# Patient Record
Sex: MALE | Race: WHITE | NOT HISPANIC OR LATINO | Employment: OTHER | URBAN - METROPOLITAN AREA
[De-identification: names, ages, dates, MRNs, and addresses within clinical notes are randomized per-mention and may not be internally consistent; named-entity substitution may affect disease eponyms.]

---

## 2017-09-29 ENCOUNTER — FLU SHOT (OUTPATIENT)
Dept: FAMILY MEDICINE CLINIC | Facility: CLINIC | Age: 67
End: 2017-09-29

## 2017-09-29 PROCEDURE — 90471 IMMUNIZATION ADMIN: CPT | Performed by: FAMILY MEDICINE

## 2017-09-29 PROCEDURE — 90662 IIV NO PRSV INCREASED AG IM: CPT | Performed by: FAMILY MEDICINE

## 2018-09-11 ENCOUNTER — OFFICE VISIT (OUTPATIENT)
Dept: INTERNAL MEDICINE | Facility: CLINIC | Age: 68
End: 2018-09-11

## 2018-09-11 VITALS
RESPIRATION RATE: 16 BRPM | TEMPERATURE: 98.3 F | BODY MASS INDEX: 31.15 KG/M2 | DIASTOLIC BLOOD PRESSURE: 102 MMHG | HEIGHT: 72 IN | HEART RATE: 75 BPM | SYSTOLIC BLOOD PRESSURE: 179 MMHG | OXYGEN SATURATION: 98 % | WEIGHT: 230 LBS

## 2018-09-11 DIAGNOSIS — I73.9 CLAUDICATION OF LEFT LOWER EXTREMITY (HCC): Primary | ICD-10-CM

## 2018-09-11 PROCEDURE — 99213 OFFICE O/P EST LOW 20 MIN: CPT | Performed by: FAMILY MEDICINE

## 2018-09-11 NOTE — PROGRESS NOTES
CC:L leg     Subjective.../HPI  Patient present today with L leg pain 3-4 weeks,thigh and calf,no swelling. No hx of back injury    I have reviewed the patient's medical history in detail and updated the computerized patient record.    History reviewed. No pertinent past medical history.    Past Surgical History:   Procedure Laterality Date   • ADENOIDECTOMY         Family History   Problem Relation Age of Onset   • Heart disease Father        Social History     Social History   • Marital status:      Spouse name: N/A   • Number of children: N/A   • Years of education: N/A     Occupational History   • Not on file.     Social History Main Topics   • Smoking status: Former Smoker   • Smokeless tobacco: Never Used   • Alcohol use No   • Drug use: No   • Sexual activity: Yes     Partners: Female     Other Topics Concern   • Not on file     Social History Narrative   • No narrative on file       Most Recent Immunizations   Administered Date(s) Administered   • Flu Vaccine High Dose PF 65YR+ 09/29/2017   • Flu Vaccine Quad PF >18YRS 09/21/2016       Review of Systems:   Review of Systems   Constitutional: Negative.    HENT: Negative.    Eyes: Negative.    Respiratory: Negative.    Cardiovascular: Negative.    Gastrointestinal: Negative.    Endocrine: Negative.    Genitourinary: Negative.    Musculoskeletal: Negative.    Skin: Negative.    Allergic/Immunologic: Negative.    Neurological: Negative.    Hematological: Negative.    Psychiatric/Behavioral: Negative.          Physical Exam   Constitutional: He is oriented to person, place, and time. He appears well-developed and well-nourished.   Cardiovascular: Normal rate, regular rhythm and normal heart sounds.    Pulmonary/Chest: Effort normal and breath sounds normal.   Neurological: He is alert and oriented to person, place, and time.   Psychiatric: He has a normal mood and affect. His behavior is normal.   Vitals reviewed.  no DP pulses  Leg no numbness and  "FROM    Vital Signs     Vitals:    09/11/18 1831   BP: (!) 179/102   BP Location: Left arm   Patient Position: Sitting   Cuff Size: Large Adult   Pulse: 75   Resp: 16   Temp: 98.3 °F (36.8 °C)   TempSrc: Oral   SpO2: 98%   Weight: 104 kg (230 lb)   Height: 182.9 cm (72\")          Results Review:      REVIEWED AND DISCUSSED CLINICAL RESULTS WITH PATIENT      Requested Prescriptions      No prescriptions requested or ordered in this encounter       No current outpatient prescriptions on file.    Procedures          There are no diagnoses linked to this encounter.     No Follow-up on file.    Lon Irwin M.D  09/11/18  6:55 PM          "

## 2018-09-17 ENCOUNTER — HOSPITAL ENCOUNTER (OUTPATIENT)
Dept: CARDIOLOGY | Facility: HOSPITAL | Age: 68
Discharge: HOME | End: 2018-09-17
Attending: FAMILY MEDICINE | Admitting: FAMILY MEDICINE

## 2018-09-17 DIAGNOSIS — I73.9 CLAUDICATION OF LEFT LOWER EXTREMITY (HCC): ICD-10-CM

## 2018-09-17 LAB
BH CV LOWER ARTERIAL LEFT ABI RATIO: 0.4
BH CV LOWER ARTERIAL LEFT DORSALIS PEDIS SYS MAX: 75 MMHG
BH CV LOWER ARTERIAL LEFT GREAT TOE SYS MAX: 51 MMHG
BH CV LOWER ARTERIAL LEFT HIGH THIGH SYS MAX: 112 MMHG
BH CV LOWER ARTERIAL LEFT LOW THIGH SYS MAX: 107 MMHG
BH CV LOWER ARTERIAL LEFT POPLITEAL SYS MAX: 84 MMHG
BH CV LOWER ARTERIAL LEFT POST TIBIAL SYS MAX: 77 MMHG
BH CV LOWER ARTERIAL LEFT TBI RATIO: 0.26
BH CV LOWER ARTERIAL RIGHT ABI RATIO: 1.11
BH CV LOWER ARTERIAL RIGHT DORSALIS PEDIS SYS MAX: 214 MMHG
BH CV LOWER ARTERIAL RIGHT GREAT TOE SYS MAX: 157 MMHG
BH CV LOWER ARTERIAL RIGHT HIGH THIGH SYS MAX: 233 MMHG
BH CV LOWER ARTERIAL RIGHT LOW THIGH SYS MAX: 241 MMHG
BH CV LOWER ARTERIAL RIGHT POPLITEAL SYS MAX: 204 MMHG
BH CV LOWER ARTERIAL RIGHT POST TIBIAL SYS MAX: 215 MMHG
BH CV LOWER ARTERIAL RIGHT TBI RATIO: 0.81
UPPER ARTERIAL LEFT ARM BRACHIAL SYS MAX: 189 MMHG
UPPER ARTERIAL RIGHT ARM BRACHIAL SYS MAX: 194 MMHG

## 2018-09-17 PROCEDURE — 93923 UPR/LXTR ART STDY 3+ LVLS: CPT

## 2018-09-21 ENCOUNTER — TELEPHONE (OUTPATIENT)
Dept: INTERNAL MEDICINE | Facility: CLINIC | Age: 68
End: 2018-09-21

## 2018-09-25 DIAGNOSIS — I73.9 PERIPHERAL VASCULAR DISEASE OF LOWER EXTREMITY (HCC): Primary | ICD-10-CM

## 2018-09-27 ENCOUNTER — TRANSCRIBE ORDERS (OUTPATIENT)
Dept: ADMINISTRATIVE | Facility: HOSPITAL | Age: 68
End: 2018-09-27

## 2018-09-27 DIAGNOSIS — I73.9 CLAUDICATION (HCC): Primary | ICD-10-CM

## 2018-10-04 ENCOUNTER — HOSPITAL ENCOUNTER (OUTPATIENT)
Dept: CT IMAGING | Facility: HOSPITAL | Age: 68
Discharge: HOME | End: 2018-10-04
Attending: SURGERY | Admitting: SURGERY

## 2018-10-04 DIAGNOSIS — I73.9 CLAUDICATION (HCC): ICD-10-CM

## 2018-10-04 LAB — CREAT BLDA-MCNC: 1.3 MG/DL (ref 0.6–1.3)

## 2018-10-04 PROCEDURE — 75635 CT ANGIO ABDOMINAL ARTERIES: CPT

## 2018-10-04 PROCEDURE — 82565 ASSAY OF CREATININE: CPT

## 2018-10-04 PROCEDURE — 0 IOPAMIDOL PER 1 ML: Performed by: SURGERY

## 2018-10-04 RX ADMIN — IOPAMIDOL 100 ML: 755 INJECTION, SOLUTION INTRAVENOUS at 09:37

## 2021-02-10 ENCOUNTER — IMMUNIZATION (OUTPATIENT)
Dept: VACCINE CLINIC | Facility: HOSPITAL | Age: 71
End: 2021-02-10

## 2021-02-10 PROCEDURE — 0001A: CPT | Performed by: INTERNAL MEDICINE

## 2021-02-10 PROCEDURE — 91300 HC SARSCOV02 VAC 30MCG/0.3ML IM: CPT | Performed by: INTERNAL MEDICINE

## 2021-03-03 ENCOUNTER — IMMUNIZATION (OUTPATIENT)
Dept: VACCINE CLINIC | Facility: HOSPITAL | Age: 71
End: 2021-03-03

## 2021-03-03 PROCEDURE — 91300 HC SARSCOV02 VAC 30MCG/0.3ML IM: CPT | Performed by: INTERNAL MEDICINE

## 2021-03-03 PROCEDURE — 0002A: CPT | Performed by: INTERNAL MEDICINE

## 2021-10-06 ENCOUNTER — IMMUNIZATION (OUTPATIENT)
Dept: VACCINE CLINIC | Facility: HOSPITAL | Age: 71
End: 2021-10-06

## 2021-10-06 PROCEDURE — 0004A ADM SARSCOV2 30MCG/0.3ML BOOSTER: CPT | Performed by: INTERNAL MEDICINE

## 2021-10-06 PROCEDURE — 0003A: CPT | Performed by: INTERNAL MEDICINE

## 2021-10-06 PROCEDURE — 91300 HC SARSCOV02 VAC 30MCG/0.3ML IM: CPT | Performed by: INTERNAL MEDICINE

## 2024-04-29 DIAGNOSIS — I73.9 PAD (PERIPHERAL ARTERY DISEASE): ICD-10-CM

## 2024-04-29 DIAGNOSIS — I65.23 BILATERAL CAROTID ARTERY OCCLUSION: Primary | ICD-10-CM

## 2024-04-29 RX ORDER — CILOSTAZOL 100 MG/1
100 TABLET ORAL 2 TIMES DAILY
Qty: 60 TABLET | Refills: 5 | Status: SHIPPED | OUTPATIENT
Start: 2024-04-29

## 2024-05-06 PROBLEM — I65.29 CAROTID ARTERY STENOSIS: Status: ACTIVE | Noted: 2024-05-06

## 2024-05-06 PROBLEM — I73.9 PERIPHERAL VASCULAR DISEASE: Status: ACTIVE | Noted: 2024-05-06

## 2024-05-30 ENCOUNTER — OFFICE VISIT (OUTPATIENT)
Age: 74
End: 2024-05-30
Payer: COMMERCIAL

## 2024-05-30 ENCOUNTER — HOSPITAL ENCOUNTER (OUTPATIENT)
Facility: HOSPITAL | Age: 74
Discharge: HOME | End: 2024-05-30
Payer: COMMERCIAL

## 2024-05-30 VITALS
SYSTOLIC BLOOD PRESSURE: 162 MMHG | BODY MASS INDEX: 28.44 KG/M2 | DIASTOLIC BLOOD PRESSURE: 82 MMHG | HEIGHT: 72 IN | WEIGHT: 210 LBS

## 2024-05-30 DIAGNOSIS — I65.23 CAROTID STENOSIS, BILATERAL: ICD-10-CM

## 2024-05-30 DIAGNOSIS — I73.9 PAD (PERIPHERAL ARTERY DISEASE): ICD-10-CM

## 2024-05-30 DIAGNOSIS — I65.23 BILATERAL CAROTID ARTERY STENOSIS: Primary | ICD-10-CM

## 2024-05-30 DIAGNOSIS — I65.23 BILATERAL CAROTID ARTERY OCCLUSION: ICD-10-CM

## 2024-05-30 DIAGNOSIS — I73.9 PERIPHERAL VASCULAR DISEASE: ICD-10-CM

## 2024-05-30 PROCEDURE — 93922 UPR/L XTREMITY ART 2 LEVELS: CPT

## 2024-05-30 PROCEDURE — 93880 EXTRACRANIAL BILAT STUDY: CPT

## 2024-05-30 NOTE — PROGRESS NOTES
Chief Complaint  Carotid Artery Disease and Peripheral Vascular Disease    Subjective        Michael Johnson presents to [facility] VASCULAR SURGERY  HPI   Michael Johnson is a 74 y.o. male that has been followed in our office Dr. Camargo for carotid artery stenosis and peripheral arterial disease.  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  Dr. Camargo had discussed proceeding with surgery with him in the past, however he had preferred medical treatment with Pletal and a walking protocol.  He returns today in follow up along with a carotid duplex and ABIs. He  reports he has been doing well without hospitalizations or surgeries. He denies any symptoms consistent with CVA, TIA, or amaurosis fugax. He  denies any worsening claudication symptoms, rest pain, or tissue loss. He reports improvement in symptoms since last visit.     Review of Systems   Constitutional:  Negative for fever.   Eyes:  Negative for visual disturbance.   Cardiovascular:  Negative for leg swelling.   Gastrointestinal:  Negative for abdominal pain.   Musculoskeletal:  Negative for back pain.   Skin:  Negative for color change, pallor and wound.   Neurological:  Negative for dizziness, facial asymmetry, speech difficulty and weakness.        Michael Johnson  reports that he has quit smoking. He has never used smokeless tobacco..        Objective   Vital Signs:  Vitals:    05/30/24 1357   BP: 162/82      Body mass index is 28.48 kg/m².   BMI is >= 25 and <30. (Overweight) The following options were offered after discussion;: information on healthy weight added to patient's after visit summary         Physical Exam  Vitals reviewed.   Constitutional:       Appearance: Normal appearance.   HENT:      Head: Normocephalic.   Cardiovascular:      Rate and Rhythm: Normal rate and regular rhythm.      Pulses: Normal pulses.           Dorsalis pedis pulses are detected w/ Doppler on the right side and detected w/ Doppler  on the left side.        Posterior tibial pulses are detected w/ Doppler on the right side and detected w/ Doppler on the left side.   Pulmonary:      Effort: Pulmonary effort is normal.   Skin:     General: Skin is warm.   Neurological:      General: No focal deficit present.      Mental Status: He is alert and oriented to person, place, and time.   Psychiatric:         Mood and Affect: Mood normal.          Result Review :      Previous carotid duplex: Less than 50% stenosis on the right, 50 to 69% stenosis on the left    Carotid duplex from today: Normal velocities on the right, 50 to 69% stenosis on the left    Previous ABIs: 0.6 on the right, 0.41 on the left    ABIs today: 0.65 on the right, 0.55 on the left                   Assessment and Plan     Diagnoses and all orders for this visit:    1. Bilateral carotid artery stenosis (Primary)    2. Peripheral vascular disease  -     Doppler Ankle Brachial Index Single Level CAR; Future    3. Carotid stenosis, bilateral  -     Duplex Carotid Ultrasound CAR; Future             Patient presents today for ongoing management of his  carotid artery stenosis and peripheral arterial disease. He is to continue his antiplatelet agent which is cilostazol. He is on a statin for cholesterol control.  We discussed adequate blood pressure control. He will return in 1 year along with a repeat carotid artery duplex and ALEJANDRA.    Follow Up     Return in about 1 year (around 5/30/2025) for carotid duplex, alejandra.  Patient was given instructions and counseling regarding his condition or for health maintenance advice. Please see specific information pulled into the AVS if appropriate.     TAMI Bee

## 2024-05-31 LAB
BH CV LOWER ARTERIAL LEFT ABI RATIO: 0.55
BH CV LOWER ARTERIAL LEFT DORSALIS PEDIS SYS MAX: 68
BH CV LOWER ARTERIAL LEFT POST TIBIAL SYS MAX: 92
BH CV LOWER ARTERIAL RIGHT ABI RATIO: 0.65
BH CV LOWER ARTERIAL RIGHT DORSALIS PEDIS SYS MAX: 110
BH CV LOWER ARTERIAL RIGHT POST TIBIAL SYS MAX: 108
UPPER ARTERIAL LEFT ARM BRACHIAL SYS MAX: 168
UPPER ARTERIAL RIGHT ARM BRACHIAL SYS MAX: 162

## 2024-06-01 LAB
BH CV XLRA MEAS LEFT CAROTID BULB EDV: 30.6 CM/SEC
BH CV XLRA MEAS LEFT CAROTID BULB PSV: 83.3 CM/SEC
BH CV XLRA MEAS LEFT DIST CCA EDV: -24 CM/SEC
BH CV XLRA MEAS LEFT DIST CCA PSV: -80.3 CM/SEC
BH CV XLRA MEAS LEFT DIST ICA EDV: -29.5 CM/SEC
BH CV XLRA MEAS LEFT DIST ICA PSV: -101.2 CM/SEC
BH CV XLRA MEAS LEFT ICA/CCA RATIO: 2.45
BH CV XLRA MEAS LEFT MID CCA EDV: 22.3 CM/SEC
BH CV XLRA MEAS LEFT MID CCA PSV: 78 CM/SEC
BH CV XLRA MEAS LEFT MID ICA EDV: -60.2 CM/SEC
BH CV XLRA MEAS LEFT MID ICA PSV: -179.4 CM/SEC
BH CV XLRA MEAS LEFT PROX CCA EDV: 15.2 CM/SEC
BH CV XLRA MEAS LEFT PROX CCA PSV: 75.6 CM/SEC
BH CV XLRA MEAS LEFT PROX ECA EDV: -17.3 CM/SEC
BH CV XLRA MEAS LEFT PROX ECA PSV: -147.7 CM/SEC
BH CV XLRA MEAS LEFT PROX ICA EDV: -58.9 CM/SEC
BH CV XLRA MEAS LEFT PROX ICA PSV: -196.4 CM/SEC
BH CV XLRA MEAS LEFT PROX SCLA PSV: 182 CM/SEC
BH CV XLRA MEAS LEFT VERTEBRAL A EDV: 13.5 CM/SEC
BH CV XLRA MEAS LEFT VERTEBRAL A PSV: 59.2 CM/SEC
BH CV XLRA MEAS RIGHT CAROTID BULB EDV: -24 CM/SEC
BH CV XLRA MEAS RIGHT CAROTID BULB PSV: -72.7 CM/SEC
BH CV XLRA MEAS RIGHT DIST CCA EDV: -19.9 CM/SEC
BH CV XLRA MEAS RIGHT DIST CCA PSV: -73.9 CM/SEC
BH CV XLRA MEAS RIGHT DIST ICA EDV: -29.3 CM/SEC
BH CV XLRA MEAS RIGHT DIST ICA PSV: -88.5 CM/SEC
BH CV XLRA MEAS RIGHT ICA/CCA RATIO: 1.2
BH CV XLRA MEAS RIGHT MID CCA EDV: 18.2 CM/SEC
BH CV XLRA MEAS RIGHT MID CCA PSV: 70.9 CM/SEC
BH CV XLRA MEAS RIGHT MID ICA EDV: 24 CM/SEC
BH CV XLRA MEAS RIGHT MID ICA PSV: 80.9 CM/SEC
BH CV XLRA MEAS RIGHT PROX CCA EDV: 16.5 CM/SEC
BH CV XLRA MEAS RIGHT PROX CCA PSV: 75.4 CM/SEC
BH CV XLRA MEAS RIGHT PROX ECA EDV: -15.8 CM/SEC
BH CV XLRA MEAS RIGHT PROX ECA PSV: -95 CM/SEC
BH CV XLRA MEAS RIGHT PROX ICA EDV: -26.4 CM/SEC
BH CV XLRA MEAS RIGHT PROX ICA PSV: -76.8 CM/SEC
BH CV XLRA MEAS RIGHT PROX SCLA PSV: 161.1 CM/SEC
BH CV XLRA MEAS RIGHT VERTEBRAL A EDV: 24.6 CM/SEC
BH CV XLRA MEAS RIGHT VERTEBRAL A PSV: 80.3 CM/SEC
LEFT ARM BP: NORMAL MMHG
RIGHT ARM BP: NORMAL MMHG

## 2024-07-30 DIAGNOSIS — I73.9 PAD (PERIPHERAL ARTERY DISEASE): ICD-10-CM

## 2024-07-30 RX ORDER — CILOSTAZOL 100 MG/1
100 TABLET ORAL 2 TIMES DAILY
Qty: 60 TABLET | Refills: 5 | Status: SHIPPED | OUTPATIENT
Start: 2024-07-30

## 2024-09-04 RX ORDER — ATORVASTATIN CALCIUM 40 MG/1
40 TABLET, FILM COATED ORAL DAILY
Qty: 90 TABLET | Refills: 3 | Status: SHIPPED | OUTPATIENT
Start: 2024-09-04

## 2024-11-05 DIAGNOSIS — I73.9 PAD (PERIPHERAL ARTERY DISEASE): ICD-10-CM

## 2024-11-06 RX ORDER — CILOSTAZOL 100 MG/1
100 TABLET ORAL 2 TIMES DAILY
Qty: 60 TABLET | Refills: 5 | Status: SHIPPED | OUTPATIENT
Start: 2024-11-06

## 2024-12-22 ENCOUNTER — APPOINTMENT (OUTPATIENT)
Dept: GENERAL RADIOLOGY | Facility: HOSPITAL | Age: 74
End: 2024-12-22
Payer: COMMERCIAL

## 2024-12-22 ENCOUNTER — APPOINTMENT (OUTPATIENT)
Dept: CT IMAGING | Facility: HOSPITAL | Age: 74
End: 2024-12-22
Payer: COMMERCIAL

## 2024-12-22 ENCOUNTER — HOSPITAL ENCOUNTER (EMERGENCY)
Facility: HOSPITAL | Age: 74
Discharge: HOME | End: 2024-12-22
Attending: EMERGENCY MEDICINE | Admitting: EMERGENCY MEDICINE
Payer: COMMERCIAL

## 2024-12-22 VITALS
BODY MASS INDEX: 29.8 KG/M2 | RESPIRATION RATE: 22 BRPM | HEART RATE: 154 BPM | OXYGEN SATURATION: 95 % | SYSTOLIC BLOOD PRESSURE: 154 MMHG | DIASTOLIC BLOOD PRESSURE: 91 MMHG | TEMPERATURE: 98.2 F | HEIGHT: 72 IN | WEIGHT: 220.02 LBS

## 2024-12-22 DIAGNOSIS — I15.9 SECONDARY HYPERTENSION: ICD-10-CM

## 2024-12-22 DIAGNOSIS — I31.39 PERICARDIAL EFFUSION: ICD-10-CM

## 2024-12-22 DIAGNOSIS — R10.10 UPPER ABDOMINAL PAIN: Primary | ICD-10-CM

## 2024-12-22 LAB
ALBUMIN SERPL-MCNC: 3.8 G/DL (ref 3.5–5.2)
ALBUMIN/GLOB SERPL: 1.1 G/DL
ALP SERPL-CCNC: 48 U/L (ref 39–117)
ALT SERPL W P-5'-P-CCNC: 88 U/L (ref 1–41)
ANION GAP SERPL CALCULATED.3IONS-SCNC: 10.1 MMOL/L (ref 5–15)
AST SERPL-CCNC: 45 U/L (ref 1–40)
BASOPHILS # BLD AUTO: 0.03 10*3/MM3 (ref 0–0.2)
BASOPHILS NFR BLD AUTO: 0.2 % (ref 0–1.5)
BILIRUB SERPL-MCNC: 1.2 MG/DL (ref 0–1.2)
BUN SERPL-MCNC: 18 MG/DL (ref 8–23)
BUN/CREAT SERPL: 13.3 (ref 7–25)
CALCIUM SPEC-SCNC: 9.1 MG/DL (ref 8.6–10.5)
CHLORIDE SERPL-SCNC: 104 MMOL/L (ref 98–107)
CO2 SERPL-SCNC: 24.9 MMOL/L (ref 22–29)
CREAT SERPL-MCNC: 1.35 MG/DL (ref 0.76–1.27)
DEPRECATED RDW RBC AUTO: 46.6 FL (ref 37–54)
EGFRCR SERPLBLD CKD-EPI 2021: 55.4 ML/MIN/1.73
EOSINOPHIL # BLD AUTO: 0.05 10*3/MM3 (ref 0–0.4)
EOSINOPHIL NFR BLD AUTO: 0.4 % (ref 0.3–6.2)
ERYTHROCYTE [DISTWIDTH] IN BLOOD BY AUTOMATED COUNT: 12.9 % (ref 12.3–15.4)
GEN 5 1HR TROPONIN T REFLEX: 28 NG/L
GLOBULIN UR ELPH-MCNC: 3.5 GM/DL
GLUCOSE SERPL-MCNC: 121 MG/DL (ref 65–99)
HCT VFR BLD AUTO: 40.9 % (ref 37.5–51)
HGB BLD-MCNC: 14.2 G/DL (ref 13–17.7)
HOLD SPECIMEN: NORMAL
IMM GRANULOCYTES # BLD AUTO: 0.05 10*3/MM3 (ref 0–0.05)
IMM GRANULOCYTES NFR BLD AUTO: 0.4 % (ref 0–0.5)
LIPASE SERPL-CCNC: 119 U/L (ref 13–60)
LYMPHOCYTES # BLD AUTO: 1.55 10*3/MM3 (ref 0.7–3.1)
LYMPHOCYTES NFR BLD AUTO: 11.6 % (ref 19.6–45.3)
MCH RBC QN AUTO: 34.1 PG (ref 26.6–33)
MCHC RBC AUTO-ENTMCNC: 34.7 G/DL (ref 31.5–35.7)
MCV RBC AUTO: 98.3 FL (ref 79–97)
MONOCYTES # BLD AUTO: 1.5 10*3/MM3 (ref 0.1–0.9)
MONOCYTES NFR BLD AUTO: 11.2 % (ref 5–12)
NEUTROPHILS NFR BLD AUTO: 10.21 10*3/MM3 (ref 1.7–7)
NEUTROPHILS NFR BLD AUTO: 76.2 % (ref 42.7–76)
NRBC BLD AUTO-RTO: 0 /100 WBC (ref 0–0.2)
NT-PROBNP SERPL-MCNC: 362 PG/ML (ref 0–900)
PLATELET # BLD AUTO: 230 10*3/MM3 (ref 140–450)
PMV BLD AUTO: 8.9 FL (ref 6–12)
POTASSIUM SERPL-SCNC: 3.6 MMOL/L (ref 3.5–5.2)
PROT SERPL-MCNC: 7.3 G/DL (ref 6–8.5)
QT INTERVAL: 386 MS
QTC INTERVAL: 465 MS
RBC # BLD AUTO: 4.16 10*6/MM3 (ref 4.14–5.8)
SODIUM SERPL-SCNC: 139 MMOL/L (ref 136–145)
TROPONIN T % DELTA: 8 %
TROPONIN T NUMERIC DELTA: 2 NG/L
TROPONIN T SERPL HS-MCNC: 26 NG/L
WBC NRBC COR # BLD AUTO: 13.39 10*3/MM3 (ref 3.4–10.8)
WHOLE BLOOD HOLD COAG: NORMAL
WHOLE BLOOD HOLD SPECIMEN: NORMAL

## 2024-12-22 PROCEDURE — 84484 ASSAY OF TROPONIN QUANT: CPT | Performed by: EMERGENCY MEDICINE

## 2024-12-22 PROCEDURE — 71045 X-RAY EXAM CHEST 1 VIEW: CPT

## 2024-12-22 PROCEDURE — 99285 EMERGENCY DEPT VISIT HI MDM: CPT

## 2024-12-22 PROCEDURE — 36415 COLL VENOUS BLD VENIPUNCTURE: CPT

## 2024-12-22 PROCEDURE — 85025 COMPLETE CBC W/AUTO DIFF WBC: CPT | Performed by: EMERGENCY MEDICINE

## 2024-12-22 PROCEDURE — 83690 ASSAY OF LIPASE: CPT | Performed by: EMERGENCY MEDICINE

## 2024-12-22 PROCEDURE — 83880 ASSAY OF NATRIURETIC PEPTIDE: CPT | Performed by: EMERGENCY MEDICINE

## 2024-12-22 PROCEDURE — 80053 COMPREHEN METABOLIC PANEL: CPT | Performed by: EMERGENCY MEDICINE

## 2024-12-22 PROCEDURE — 74177 CT ABD & PELVIS W/CONTRAST: CPT

## 2024-12-22 PROCEDURE — 25510000001 IOPAMIDOL 61 % SOLUTION: Performed by: EMERGENCY MEDICINE

## 2024-12-22 PROCEDURE — 93005 ELECTROCARDIOGRAM TRACING: CPT | Performed by: EMERGENCY MEDICINE

## 2024-12-22 RX ORDER — AMLODIPINE BESYLATE 5 MG/1
5 TABLET ORAL DAILY
Qty: 30 TABLET | Refills: 2 | Status: SHIPPED | OUTPATIENT
Start: 2024-12-22 | End: 2025-03-22

## 2024-12-22 RX ORDER — MORPHINE SULFATE 2 MG/ML
4 INJECTION, SOLUTION INTRAMUSCULAR; INTRAVENOUS ONCE
Status: DISCONTINUED | OUTPATIENT
Start: 2024-12-22 | End: 2024-12-22

## 2024-12-22 RX ORDER — AMLODIPINE BESYLATE 5 MG/1
5 TABLET ORAL ONCE
Status: COMPLETED | OUTPATIENT
Start: 2024-12-22 | End: 2024-12-22

## 2024-12-22 RX ORDER — SODIUM CHLORIDE 0.9 % (FLUSH) 0.9 %
10 SYRINGE (ML) INJECTION AS NEEDED
Status: DISCONTINUED | OUTPATIENT
Start: 2024-12-22 | End: 2024-12-22 | Stop reason: HOSPADM

## 2024-12-22 RX ORDER — ONDANSETRON 2 MG/ML
4 INJECTION INTRAMUSCULAR; INTRAVENOUS ONCE
Status: DISCONTINUED | OUTPATIENT
Start: 2024-12-22 | End: 2024-12-22

## 2024-12-22 RX ORDER — IOPAMIDOL 612 MG/ML
85 INJECTION, SOLUTION INTRAVASCULAR
Status: COMPLETED | OUTPATIENT
Start: 2024-12-22 | End: 2024-12-22

## 2024-12-22 RX ORDER — METHOCARBAMOL 750 MG/1
750 TABLET, FILM COATED ORAL 3 TIMES DAILY PRN
Qty: 21 TABLET | Refills: 0 | Status: SHIPPED | OUTPATIENT
Start: 2024-12-22

## 2024-12-22 RX ADMIN — AMLODIPINE BESYLATE 5 MG: 5 TABLET ORAL at 12:31

## 2024-12-22 RX ADMIN — IOPAMIDOL 85 ML: 612 INJECTION, SOLUTION INTRAVENOUS at 14:57

## 2024-12-22 RX ADMIN — IBUPROFEN 600 MG: 200 TABLET, FILM COATED ORAL at 14:11

## 2024-12-22 NOTE — ED PROVIDER NOTES
EMERGENCY DEPARTMENT ENCOUNTER  Room Number:  33/33  PCP: Provider, No Known  Independent Historians: Patient  Date of encounter:  12/22/2024  Patient Care Team:  Provider, No Known as PCP - General     HPI:  Chief Complaint: had concerns including Abdominal Pain.     A complete HPI/ROS/PMH/PSH/SH/FH are unobtainable due to: None    Chronic or social conditions impacting patient care (Social Determinants of Health): None      Context: Michael Johnson is a 74 y.o. male with a medical history of PVD who presents to the ED c/o acute cough and abdominal pain.  Patient began having chest congestion 3 days ago.  He also had rhinorrhea and was evaluated in [facility].  Started on antibiotics for a ear infection and states that his symptoms are improving.  He had a coughing fit last night and feels like he strained the muscles in his upper abdomen.  Not having upper abdominal pain with deep breathing, coughing or movement.  No associated sputum production, fever, chills, nausea, vomiting, urinary complaint, diarrhea or constipation.  He was checked at the [facility] for COVID, influenza and RSV, and states that these were negative.    Patient also requests a prescription for his hypertension.  He has not had a PCP in at least 2 years because his prior PCP retired.  He was previously on amlodipine but is not sure of the dosage.    Review of prior external notes (non-ED) -and- Review of prior external test results outside of this encounter:  Reviewed [facility] note from yesterday where patient was diagnosed with acute otitis media on the right and prescribed Omnicef.    PAST MEDICAL HISTORY  Active Ambulatory Problems     Diagnosis Date Noted    Claudication of left lower extremity 09/11/2018    Carotid artery stenosis 05/06/2024    Peripheral vascular disease 05/06/2024     Resolved Ambulatory Problems     Diagnosis Date Noted    No Resolved Ambulatory Problems     Past Medical History:   Diagnosis Date    Atherosclerosis of aorta      Atherosclerosis of native arteries of extremities with intermittent claudication, left leg 01/17/2019    Atherosclerosis of native arteries of extremities with rest pain, left leg     Bilateral carotid artery stenosis 02/15/2021       PAST SURGICAL HISTORY  Past Surgical History:   Procedure Laterality Date    ADENOIDECTOMY         FAMILY HISTORY  Family History   Problem Relation Age of Onset    Clotting disorder Mother     Heart disease Father        SOCIAL HISTORY  Social History     Socioeconomic History    Marital status:    Tobacco Use    Smoking status: Former    Smokeless tobacco: Never   Substance and Sexual Activity    Alcohol use: No    Drug use: No    Sexual activity: Yes     Partners: Female       ALLERGIES  Patient has no known allergies.    REVIEW OF SYSTEMS  Review of Systems  Included in HPI  All systems reviewed and negative except for those discussed in HPI.    PHYSICAL EXAM    I have reviewed the triage vital signs and nursing notes.    ED Triage Vitals   Temp Heart Rate Resp BP SpO2   12/22/24 1124 12/22/24 1129 12/22/24 1129 12/22/24 1135 12/22/24 1129   98.2 °F (36.8 °C) 90 18 (!) 170/133 99 %      Temp src Heart Rate Source Patient Position BP Location FiO2 (%)   -- -- -- -- --              Physical Exam  Vitals and nursing note reviewed.   Constitutional:       General: He is not in acute distress.     Appearance: Normal appearance. He is not ill-appearing, toxic-appearing or diaphoretic.   HENT:      Head: Normocephalic and atraumatic.      Nose: Nose normal.      Mouth/Throat:      Mouth: Mucous membranes are moist. Mucous membranes are dry.   Eyes:      Extraocular Movements: Extraocular movements intact.      Conjunctiva/sclera: Conjunctivae normal.      Pupils: Pupils are equal, round, and reactive to light.   Cardiovascular:      Rate and Rhythm: Normal rate and regular rhythm.      Pulses: Normal pulses.      Heart sounds: Normal heart sounds. No murmur heard.     No friction  rub. No gallop.   Pulmonary:      Effort: Pulmonary effort is normal. No respiratory distress.      Breath sounds: Normal breath sounds. No stridor. No wheezing, rhonchi or rales.   Abdominal:      General: Abdomen is flat. There is no distension.      Palpations: Abdomen is soft.      Tenderness: There is no abdominal tenderness. There is no guarding or rebound.   Musculoskeletal:      Cervical back: Normal range of motion and neck supple.   Skin:     General: Skin is warm and dry.      Capillary Refill: Capillary refill takes less than 2 seconds.   Neurological:      General: No focal deficit present.      Mental Status: He is alert and oriented to person, place, and time. Mental status is at baseline.   Psychiatric:         Mood and Affect: Mood normal.         Behavior: Behavior normal.         Thought Content: Thought content normal.         Judgment: Judgment normal.         LAB RESULTS  Recent Results (from the past 24 hours)   Covid-19 + Flu A&B AG, Veritor ([id number])    Collection Time: 12/22/24 10:32 AM    Specimen: Swab   Result Value Ref Range    SARS Antigen Not Detected Not Detected, Presumptive Negative    Influenza A Antigen EVIN Not Detected Not Detected    Influenza B Antigen EVIN Not Detected Not Detected    Internal Control Passed Passed    Lot Number [id number]     Expiration Date 10,112,025    ECG 12 Lead ED Triage Standing Order; SOA    Collection Time: 12/22/24 12:04 PM   Result Value Ref Range    QT Interval 386 ms    QTC Interval 465 ms   Comprehensive Metabolic Panel    Collection Time: 12/22/24 12:30 PM    Specimen: Arm, Right; Blood   Result Value Ref Range    Glucose 121 (H) 65 - 99 mg/dL    BUN 18 8 - 23 mg/dL    Creatinine 1.35 (H) 0.76 - 1.27 mg/dL    Sodium 139 136 - 145 mmol/L    Potassium 3.6 3.5 - 5.2 mmol/L    Chloride 104 98 - 107 mmol/L    CO2 24.9 22.0 - 29.0 mmol/L    Calcium 9.1 8.6 - 10.5 mg/dL    Total Protein 7.3 6.0 - 8.5 g/dL    Albumin 3.8 3.5 - 5.2 g/dL    ALT (SGPT) 88  (H) 1 - 41 U/L    AST (SGOT) 45 (H) 1 - 40 U/L    Alkaline Phosphatase 48 39 - 117 U/L    Total Bilirubin 1.2 0.0 - 1.2 mg/dL    Globulin 3.5 gm/dL    A/G Ratio 1.1 g/dL    BUN/Creatinine Ratio 13.3 7.0 - 25.0    Anion Gap 10.1 5.0 - 15.0 mmol/L    eGFR 55.4 (L) >60.0 mL/min/1.73   BNP    Collection Time: 12/22/24 12:30 PM    Specimen: Arm, Right; Blood   Result Value Ref Range    proBNP 362.0 0.0 - 900.0 pg/mL   High Sensitivity Troponin T    Collection Time: 12/22/24 12:30 PM    Specimen: Arm, Right; Blood   Result Value Ref Range    HS Troponin T 26 (H) <22 ng/L   CBC Auto Differential    Collection Time: 12/22/24 12:30 PM    Specimen: Arm, Right; Blood   Result Value Ref Range    WBC 13.39 (H) 3.40 - 10.80 10*3/mm3    RBC 4.16 4.14 - 5.80 10*6/mm3    Hemoglobin 14.2 13.0 - 17.7 g/dL    Hematocrit 40.9 37.5 - 51.0 %    MCV 98.3 (H) 79.0 - 97.0 fL    MCH 34.1 (H) 26.6 - 33.0 pg    MCHC 34.7 31.5 - 35.7 g/dL    RDW 12.9 12.3 - 15.4 %    RDW-SD 46.6 37.0 - 54.0 fl    MPV 8.9 6.0 - 12.0 fL    Platelets 230 140 - 450 10*3/mm3    Neutrophil % 76.2 (H) 42.7 - 76.0 %    Lymphocyte % 11.6 (L) 19.6 - 45.3 %    Monocyte % 11.2 5.0 - 12.0 %    Eosinophil % 0.4 0.3 - 6.2 %    Basophil % 0.2 0.0 - 1.5 %    Immature Grans % 0.4 0.0 - 0.5 %    Neutrophils, Absolute 10.21 (H) 1.70 - 7.00 10*3/mm3    Lymphocytes, Absolute 1.55 0.70 - 3.10 10*3/mm3    Monocytes, Absolute 1.50 (H) 0.10 - 0.90 10*3/mm3    Eosinophils, Absolute 0.05 0.00 - 0.40 10*3/mm3    Basophils, Absolute 0.03 0.00 - 0.20 10*3/mm3    Immature Grans, Absolute 0.05 0.00 - 0.05 10*3/mm3    nRBC 0.0 0.0 - 0.2 /100 WBC   Green Top (Gel)    Collection Time: 12/22/24 12:30 PM   Result Value Ref Range    Extra Tube Hold for add-ons.    Lavender Top    Collection Time: 12/22/24 12:30 PM   Result Value Ref Range    Extra Tube hold for add-on    Light Blue Top    Collection Time: 12/22/24 12:30 PM   Result Value Ref Range    Extra Tube Hold for add-ons.    Lipase     Collection Time: 12/22/24 12:30 PM    Specimen: Arm, Right; Blood   Result Value Ref Range    Lipase 119 (H) 13 - 60 U/L   High Sensitivity Troponin T 1Hr    Collection Time: 12/22/24  1:54 PM    Specimen: Blood   Result Value Ref Range    HS Troponin T 28 (H) <22 ng/L    Troponin T Numeric Delta 2 ng/L    Troponin T % Delta 8 Abnormal if >/= 20% %       RADIOLOGY  CT Abdomen Pelvis With Contrast    Result Date: 12/22/2024  CT ABDOMEN PELVIS W CONTRAST-  INDICATION: Epigastric pain  COMPARISON: CT abdomen pelvis with runoff October 4, 2018  TECHNIQUE: Routine CT abdomen/pelvis with IV contrast. Coronal and sagittal reformats. Radiation dose reduction techniques were utilized, including automated exposure control and exposure modulation based on body size.  FINDINGS:  Lung bases: Small left pleural effusion. Suspect subsegmental atelectasis at the lung bases. Coronary artery atherosclerotic calcifications. Aortic valve calcification. Trace pericardial fluid versus pericardial thickening, with some adjacent stranding in the pericardial fat.  ABDOMEN: Normal liver and gallbladder. Calcifications in the spleen, consistent with prior granulomatous infection. Spleen is normal in size. No pancreatic mass or pancreatic ductal dilatation seen. No adrenal nodules. No solid-appearing renal mass or hydronephrosis. Suspect a subcentimeter cyst in the left mid kidney.  Pelvis: Mild prostatomegaly with the prostate measuring 5.1 cm in right to left dimension. Bladder is collapsed. No bladder calculus.  Bowel: No obstruction. Colonic diverticulosis. Normal appendix.  Abdominal wall: Small fat-containing inguinal hernias.  Retroperitoneum: No lymphadenopathy.  Vasculature: Severe aortoiliac atherosclerotic calcification. Left common iliac artery is occluded, with reconstitution in the distal external iliac artery. High-grade stenosis suspected in the left common and superficial femoral arteries. High-grade atherosclerotic stenosis  suspected in the right superficial femoral artery.  Osseous structures: No destructive osseous lesions. Lumbar facet degenerative arthropathy with grade 1 anterolisthesis of L4 on L5. Exaggerated lower thoracic kyphosis with spondylosis and bulky marginal osteophytes and multilevel Schmorl's nodes. 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       1. No acute findings identified in the abdomen or pelvis. 2. Colonic diverticulosis. 3. Mild prostatomegaly. 4. Trace left pleural effusion. 5. Trace pericardial fluid or pericardial thickening with some stranding/inflammatory change in the adjacent fat. Clinical correlation. Question pericarditis. 6. Three-vessel coronary artery atherosclerotic calcifications and aortic valve calcification. 7. Chronic occlusion of the left common iliac artery. High-grade stenoses suspected in the left common and superficial femoral artery and right superficial femoral artery.  This report was finalized on 12/22/2024 3:25 PM by Dr. Abdullahi Cotto M.D on Workstation: [id number]      XR Chest 1 View    Result Date: 12/22/2024  XR CHEST 1 VW-  INDICATION: Shortness of breath  COMPARISON: CT lower extremity runoff 10/4/2018      Partially visualized retrocardiac left lower lobe opacities, atelectasis versus infiltrate. No pleural effusion or pneumothorax. Normal size cardiomediastinal silhouette. No focal osseous abnormality.  This report was finalized on 12/22/2024 1:12 PM by Dr. Eric Aponte M.D on Workstation: [id number]       MEDICATIONS GIVEN IN ER  Medications   sodium chloride 0.9 % flush 10 mL (has no administration in time range)   amLODIPine (NORVASC) tablet 5 mg (5 mg Oral Given 12/22/24 1231)   ibuprofen (ADVIL,MOTRIN) tablet 600 mg (600 mg Oral Given 12/22/24 1411)   iopamidol (ISOVUE-300) 61 % injection 85 mL (85 mL Intravenous Given 12/22/24 2397)       ORDERS PLACED DURING THIS VISIT:  Orders Placed This Encounter   Procedures     XR Chest 1 View    CT Abdomen Pelvis With Contrast    Comprehensive Metabolic Panel    BNP    High Sensitivity Troponin T    CBC Auto Differential    Upton Draw    Lipase    High Sensitivity Troponin T 1Hr    NPO Diet NPO Type: Strict NPO    Undress & Gown    Continuous Pulse Oximetry    Vital Signs    Oxygen Therapy- Nasal Cannula; Titrate 1-6 LPM Per SpO2; 90 - 95%    ECG 12 Lead ED Triage Standing Order; SOA    Insert Peripheral IV    CBC & Differential    Green Top (Gel)    Lavender Top    Light Blue Top       OUTPATIENT MEDICATION MANAGEMENT:  Current Facility-Administered Medications Ordered in Epic   Medication Dose Route Frequency Provider Last Rate Last Admin    sodium chloride 0.9 % flush 10 mL  10 mL Intravenous PRN Chidi Freed MD         Current Outpatient Medications Ordered in Epic   Medication Sig Dispense Refill    amLODIPine (NORVASC) 5 MG tablet Take 1 tablet by mouth Daily for 90 days. 30 tablet 2    atorvastatin (LIPITOR) 40 MG tablet TAKE 1 TABLET BY MOUTH EVERY DAY 90 tablet 3    cefdinir (OMNICEF) 300 MG capsule 2 capsules (at the same time) once a day 14 capsule 0    cilostazol (PLETAL) 100 MG tablet TAKE 1 TABLET BY MOUTH TWICE DAILY 60 tablet 5    methocarbamol (ROBAXIN) 750 MG tablet Take 1 tablet by mouth 3 (Three) Times a Day As Needed for Muscle Spasms. 21 tablet 0       PROCEDURES  Procedures    PROGRESS, DATA ANALYSIS, CONSULTS, AND MEDICAL DECISION MAKING  All labs have been independently interpreted by me.  All radiology studies have been reviewed by me. All EKG's have been independently viewed and interpreted by me.  Discussion below represents my analysis of pertinent findings related to patient's condition, differential diagnosis, treatment plan and final disposition.    Differential diagnosis includes but is not limited to musculoskeletal pain, pancreatitis, pneumonia.    Clinical Scores:                   ED Course as of 12/22/24 1544   Sun Dec 22, 2024   1237 EKG  interpreted by myself  Time: 1204  Rate: 87  Rhythm: NSR  No ST elevation or depression  Normal intervals  Normal morphology [AB]   1249 WBC(!): 13.39 [AB]   1249 XR Chest 1 View  My independent interpretation of the imaging study is no lobar infiltrate, small left-sided pleural effusion [AB]   1322 Lipase(!): 119 [AB]   1322 HS Troponin T(!): 26 [AB]   1322 Creatinine(!): 1.35  At baseline [AB]   1407 Patient declined the initially ordered morphine and states that he only wants Tylenol or ibuprofen.  600 mg of ibuprofen ordered instead. [AB]   1454 HS Troponin T(!): 28 [AB]   1454 Troponin T Numeric Delta: 2 [AB]   1510 CT Abdomen Pelvis With Contrast  My independent interpretation of the imaging study is no bowel obstruction, no stranding around the pancreas [AB]   1539 ALT (SGPT)(!): 88 [AB]   1539 AST (SGOT)(!): 45 [AB]   1541 Patient on results.  He was informed of the small pericardial effusion and stranding.  Patient began to denies any shortness of breath or chest pain.  He recommend cardiology follow-up for possible echocardiogram.  Referral given.  Remainder of workup is unremarkable.  Patient did have elevated lipase but no evidence of pancreatitis on imaging.  I suspect he has abdominal pain secondary to coughing and having associated upper abdominal spasms.  I will write for muscle relaxant to use for symptomatic relief.  Patient also provided with antihypertensive medications per his request as well as a PCP referral. [AB]      ED Course User Index  [AB] Chidi Freed MD             AS OF 15:44 EST VITALS:    BP - 167/90  HR - (!) 154  TEMP - 98.2 °F (36.8 °C)  O2 SATS - 95%    COMPLEXITY OF CARE  Admission was considered but after careful review of the patient's presentation, physical examination, diagnostic results, and response to treatment the patient may be safely discharged with outpatient follow-up.      DIAGNOSIS  Final diagnoses:   Upper abdominal pain   Secondary hypertension   Pericardial  effusion         DISPOSITION  ED Disposition       ED Disposition   Discharge    Condition   Stable    Comment   --                Please note that portions of this document were completed with a voice recognition program.    Note Disclaimer: At [facility], we believe that sharing information builds trust and better relationships. You are receiving this note because you recently visited [facility]. It is possible you will see health information before a provider has talked with you about it. This kind of information can be easy to misunderstand. To help you fully understand what it means for your health, we urge you to discuss this note with your provider.         Chidi Freed MD  12/22/24 6934

## 2024-12-22 NOTE — ED NOTES
Pt seen yesterday at  for an ear infection, given antibiotics. Returned today to  for having c/o chest pain/abdominal pain after having a coughing fit, and is slightly febrile. At the  had abnormal EKG and referred to ED for further evaluation. Pt also requesting BP medication. Pt is Aox4 at time of evaluation, ambulates independently to room.

## 2024-12-27 ENCOUNTER — OFFICE VISIT (OUTPATIENT)
Dept: CARDIOLOGY | Facility: CLINIC | Age: 74
End: 2024-12-27
Payer: COMMERCIAL

## 2024-12-27 VITALS
DIASTOLIC BLOOD PRESSURE: 84 MMHG | HEIGHT: 72 IN | HEART RATE: 103 BPM | WEIGHT: 239.6 LBS | BODY MASS INDEX: 32.45 KG/M2 | SYSTOLIC BLOOD PRESSURE: 146 MMHG

## 2024-12-27 DIAGNOSIS — R06.02 SOB (SHORTNESS OF BREATH): Primary | ICD-10-CM

## 2024-12-27 DIAGNOSIS — I25.10 CORONARY ARTERY CALCIFICATION: ICD-10-CM

## 2024-12-27 PROCEDURE — 99204 OFFICE O/P NEW MOD 45 MIN: CPT | Performed by: INTERNAL MEDICINE

## 2024-12-27 PROCEDURE — 93000 ELECTROCARDIOGRAM COMPLETE: CPT | Performed by: INTERNAL MEDICINE

## 2024-12-27 RX ORDER — ASPIRIN 81 MG/1
81 TABLET ORAL DAILY
Qty: 90 TABLET | Refills: 3 | Status: SHIPPED | OUTPATIENT
Start: 2024-12-27

## 2024-12-27 RX ORDER — BISOPROLOL FUMARATE 5 MG/1
5 TABLET, FILM COATED ORAL DAILY
Qty: 30 TABLET | Refills: 3 | Status: SHIPPED | OUTPATIENT
Start: 2024-12-27

## 2024-12-27 NOTE — PROGRESS NOTES
PATIENTINFORMATION    Date of Office Visit: 2024  Encounter Provider: Williams Mohr MD  Place of Service: [facility] CARDIOLOGY  Patient Name: Michael Johnson  : 1950    Subjective:     Encounter Date:2024      Patient ID: Michael Johnson is a 74 y.o. male.    Chief Complaint   Patient presents with    Shortness of Breath       HPI  Mr. Johnson is a pleasant 74 years old man came to cardiac clinic for post ER visit follow-up.  He had to go to the ER on 2024 with complaints of severe cough and resultant chest discomfort.  ER workup showed borderline elevated troponin without significant delta and nonischemic EKG showing frequent PACs and mild sinus tachycardia.  He denies having palpitations.  He probably had a viral infection that he is still recovering from with generalized body weakness.  He denies rest or exertional chest discomfort but some shortness of breath.  Denies any orthopnea, PND, palpitations, extremity swelling.  He has longstanding hypertension on amlodipine but reports blood pressure running high on amlodipine.  No prior coronary angiogram      ROS  All systems reviewed and negative except as noted in HPI.    Past Medical History:   Diagnosis Date    Atherosclerosis of aorta     Atherosclerosis of native arteries of extremities with intermittent claudication, left leg 2019    Atherosclerosis of native arteries of extremities with rest pain, left leg     Bilateral carotid artery stenosis 02/15/2021       Past Surgical History:   Procedure Laterality Date    ADENOIDECTOMY         Social History     Socioeconomic History    Marital status:    Tobacco Use    Smoking status: Former    Smokeless tobacco: Never   Substance and Sexual Activity    Alcohol use: No    Drug use: No    Sexual activity: Yes     Partners: Female       Family History   Problem Relation Age of Onset    Clotting disorder Mother     Hypertension Father     Heart disease  "Father            ECG 12 Lead    Date/Time: 12/27/2024 3:31 PM  Performed by: Williams Mohr MD    Authorized by: Williams Mohr MD  Comparison: compared with previous ECG from 12/22/2024  Rhythm: sinus rhythm  Rate: normal  Conduction: conduction normal  ST Segments: ST segments normal  T Waves: T waves normal  QRS axis: normal  Other: no other findings    Clinical impression: normal ECG             Objective:     /84 (BP Location: Right arm, Patient Position: Sitting, Cuff Size: Adult)   Pulse 103   Ht 182.9 cm (72\")   Wt 109 kg (239 lb 9.6 oz)   BMI 32.50 kg/m²  Body mass index is 32.5 kg/m².     Constitutional:       General: Not in acute distress.     Appearance: Well-developed. Not diaphoretic.   Eyes:      Pupils: Pupils are equal, round, and reactive to light.   HENT:      Head: Normocephalic and atraumatic.   Neck:      Thyroid: No thyromegaly.   Pulmonary:      Effort: Pulmonary effort is normal. No respiratory distress.      Breath sounds: Normal breath sounds. No wheezing. No rales.   Chest:      Chest wall: Not tender to palpatation.   Cardiovascular:      Normal rate. Regular rhythm.      No gallop.    Pulses:     Intact distal pulses.   Edema:     Peripheral edema absent.   Abdominal:      General: Bowel sounds are normal. There is no distension.      Palpations: Abdomen is soft.      Tenderness: There is no guarding.   Musculoskeletal: Normal range of motion.         General: No deformity.      Cervical back: Normal range of motion and neck supple. Skin:     General: Skin is warm and dry.      Findings: No rash.   Neurological:      Mental Status: Alert and oriented to person, place, and time.      Cranial Nerves: No cranial nerve deficit.      Deep Tendon Reflexes: Reflexes are normal and symmetric.   Psychiatric:         Judgment: Judgment normal.         Review Of Data: I have reviewed pertinent recent labs, images and documents and pertinent findings included in HPI or " assessment below.          Assessment/Plan:   Exertional shortness of breath: Recovering from unspecified viral infection with severe cough which and chest discomfort with initially started on treatment for right infection.  Essential hypertension-on amlodipine.  BP above goal.  Hyperlipidemia on atorvastatin  Significant coronary calcification incidentally noted on CT chest.  PAD/carotid artery disease-sees vascular.  No intervention in the past.    Significant risk factor for CAD that are not controlled.  I will send him for stress testing and echocardiogram.  I have added bisoprolol to current and hypertensive regimen.  He will continue aspirin, amlodipine and atorvastatin.    Diagnosis and plan of care discussed with patient and verbalized understanding.            Your medication list            Accurate as of December 27, 2024  3:32 PM. If you have any questions, ask your nurse or doctor.                START taking these medications        Instructions Last Dose Given Next Dose Due   aspirin 81 MG EC tablet  Started by: Williams Mohr      Take 1 tablet by mouth Daily.       bisoprolol 5 MG tablet  Commonly known as: ZEBeta  Started by: Williams Mohr      Take 1 tablet by mouth Daily.              CONTINUE taking these medications        Instructions Last Dose Given Next Dose Due   amLODIPine 5 MG tablet  Commonly known as: NORVASC      Take 1 tablet by mouth Daily for 90 days.       atorvastatin 40 MG tablet  Commonly known as: LIPITOR      TAKE 1 TABLET BY MOUTH EVERY DAY       cilostazol 100 MG tablet  Commonly known as: PLETAL      TAKE 1 TABLET BY MOUTH TWICE DAILY       methocarbamol 750 MG tablet  Commonly known as: ROBAXIN      Take 1 tablet by mouth 3 (Three) Times a Day As Needed for Muscle Spasms.                 Where to Get Your Medications        These medications were sent to [id number] - [place], [place] - [address] AT [facility] [address]  [phone]  -  [phone] FX  [address] [facility], [facility] [address]      Phone: [phone]   aspirin 81 MG EC tablet  bisoprolol 5 MG tablet             Williams Mohr MD  12/27/24  15:32 EST

## 2025-01-07 ENCOUNTER — TELEPHONE (OUTPATIENT)
Dept: CARDIOLOGY | Facility: CLINIC | Age: 75
End: 2025-01-07
Payer: COMMERCIAL

## 2025-01-07 DIAGNOSIS — I65.23 BILATERAL CAROTID ARTERY STENOSIS: Primary | ICD-10-CM

## 2025-01-07 DIAGNOSIS — I73.9 PAD (PERIPHERAL ARTERY DISEASE): ICD-10-CM

## 2025-01-08 ENCOUNTER — HOSPITAL ENCOUNTER (OUTPATIENT)
Dept: CARDIOLOGY | Facility: HOSPITAL | Age: 75
Discharge: HOME | End: 2025-01-08
Payer: COMMERCIAL

## 2025-01-08 ENCOUNTER — HOSPITAL ENCOUNTER (OUTPATIENT)
Dept: CARDIOLOGY | Facility: HOSPITAL | Age: 75
Discharge: HOME | End: 2025-01-08
Admitting: INTERNAL MEDICINE
Payer: COMMERCIAL

## 2025-01-08 VITALS
DIASTOLIC BLOOD PRESSURE: 68 MMHG | BODY MASS INDEX: 32.37 KG/M2 | WEIGHT: 239 LBS | HEIGHT: 72 IN | SYSTOLIC BLOOD PRESSURE: 142 MMHG | HEART RATE: 70 BPM

## 2025-01-08 DIAGNOSIS — R06.02 SOB (SHORTNESS OF BREATH): ICD-10-CM

## 2025-01-08 DIAGNOSIS — I25.10 CORONARY ARTERY CALCIFICATION: ICD-10-CM

## 2025-01-08 LAB
AORTIC DIMENSIONLESS INDEX: 0.44 (DI)
ASCENDING AORTA: 3 CM
AV MEAN PRESS GRAD SYS DOP V1V2: 10.7 MMHG
AV VMAX SYS DOP: 238.1 CM/SEC
BH CV ECHO MEAS - ACS: 1.24 CM
BH CV ECHO MEAS - AO MAX PG: 22.7 MMHG
BH CV ECHO MEAS - AO ROOT DIAM: 3.5 CM
BH CV ECHO MEAS - AO V2 VTI: 48.6 CM
BH CV ECHO MEAS - AVA(I,D): 1.78 CM2
BH CV ECHO MEAS - EDV(CUBED): 134.6 ML
BH CV ECHO MEAS - EDV(MOD-SP2): 121 ML
BH CV ECHO MEAS - EDV(MOD-SP4): 131 ML
BH CV ECHO MEAS - EF(MOD-SP2): 61.2 %
BH CV ECHO MEAS - EF(MOD-SP4): 64.9 %
BH CV ECHO MEAS - ESV(CUBED): 31.2 ML
BH CV ECHO MEAS - ESV(MOD-SP2): 47 ML
BH CV ECHO MEAS - ESV(MOD-SP4): 46 ML
BH CV ECHO MEAS - FS: 38.6 %
BH CV ECHO MEAS - IVS/LVPW: 1.07 CM
BH CV ECHO MEAS - IVSD: 1.2 CM
BH CV ECHO MEAS - LAT PEAK E' VEL: 8.2 CM/SEC
BH CV ECHO MEAS - LV DIASTOLIC VOL/BSA (35-75): 57 CM2
BH CV ECHO MEAS - LV MASS(C)D: 231.5 GRAMS
BH CV ECHO MEAS - LV MAX PG: 5.3 MMHG
BH CV ECHO MEAS - LV MEAN PG: 3 MMHG
BH CV ECHO MEAS - LV SYSTOLIC VOL/BSA (12-30): 20 CM2
BH CV ECHO MEAS - LV V1 MAX: 115 CM/SEC
BH CV ECHO MEAS - LV V1 VTI: 21.4 CM
BH CV ECHO MEAS - LVIDD: 5.1 CM
BH CV ECHO MEAS - LVIDS: 3.1 CM
BH CV ECHO MEAS - LVOT AREA: 4 CM2
BH CV ECHO MEAS - LVOT DIAM: 2.27 CM
BH CV ECHO MEAS - LVPWD: 1.12 CM
BH CV ECHO MEAS - MED PEAK E' VEL: 9.6 CM/SEC
BH CV ECHO MEAS - MV A DUR: 0.16 SEC
BH CV ECHO MEAS - MV A MAX VEL: 116 CM/SEC
BH CV ECHO MEAS - MV DEC SLOPE: 236.5 CM/SEC2
BH CV ECHO MEAS - MV DEC TIME: 0.29 SEC
BH CV ECHO MEAS - MV E MAX VEL: 69.8 CM/SEC
BH CV ECHO MEAS - MV E/A: 0.6
BH CV ECHO MEAS - MV MAX PG: 5.8 MMHG
BH CV ECHO MEAS - MV MEAN PG: 1.97 MMHG
BH CV ECHO MEAS - MV P1/2T: 105.9 MSEC
BH CV ECHO MEAS - MV V2 VTI: 30.4 CM
BH CV ECHO MEAS - MVA(P1/2T): 2.08 CM2
BH CV ECHO MEAS - MVA(VTI): 2.8 CM2
BH CV ECHO MEAS - PA ACC TIME: 0.12 SEC
BH CV ECHO MEAS - PA V2 MAX: 90.9 CM/SEC
BH CV ECHO MEAS - PULM A REVS DUR: 0.17 SEC
BH CV ECHO MEAS - PULM A REVS VEL: 29.2 CM/SEC
BH CV ECHO MEAS - PULM DIAS VEL: 28.3 CM/SEC
BH CV ECHO MEAS - PULM S/D: 1.62
BH CV ECHO MEAS - PULM SYS VEL: 45.8 CM/SEC
BH CV ECHO MEAS - QP/QS: 0.97
BH CV ECHO MEAS - RAP SYSTOLE: 3 MMHG
BH CV ECHO MEAS - RV MAX PG: 1.95 MMHG
BH CV ECHO MEAS - RV V1 MAX: 69.8 CM/SEC
BH CV ECHO MEAS - RV V1 VTI: 15.1 CM
BH CV ECHO MEAS - RVOT DIAM: 2.7 CM
BH CV ECHO MEAS - SV(LVOT): 86.4 ML
BH CV ECHO MEAS - SV(MOD-SP2): 74 ML
BH CV ECHO MEAS - SV(MOD-SP4): 85 ML
BH CV ECHO MEAS - SV(RVOT): 83.5 ML
BH CV ECHO MEAS - SVI(LVOT): 37.6 ML/M2
BH CV ECHO MEAS - SVI(MOD-SP2): 32.2 ML/M2
BH CV ECHO MEAS - SVI(MOD-SP4): 37 ML/M2
BH CV ECHO MEAS - TAPSE (>1.6): 2.6 CM
BH CV ECHO MEASUREMENTS AVERAGE E/E' RATIO: 7.84
BH CV NUCLEAR PRIOR STUDY: 2
BH CV REST NUCLEAR ISOTOPE DOSE: 11.3 MCI
BH CV STRESS BP STAGE 1: NORMAL
BH CV STRESS COMMENTS STAGE 1: NORMAL
BH CV STRESS DOSE REGADENOSON STAGE 1: 0.4
BH CV STRESS DURATION MIN STAGE 1: 0
BH CV STRESS DURATION SEC STAGE 1: 10
BH CV STRESS HR STAGE 1: 85
BH CV STRESS NUCLEAR ISOTOPE DOSE: 34.9 MCI
BH CV STRESS PROTOCOL 1: NORMAL
BH CV STRESS RECOVERY BP: NORMAL MMHG
BH CV STRESS RECOVERY HR: 72 BPM
BH CV STRESS STAGE 1: 1
BH CV XLRA - RV BASE: 2.9 CM
BH CV XLRA - RV LENGTH: 8.4 CM
BH CV XLRA - RV MID: 2.36 CM
BH CV XLRA - TDI S': 14.1 CM/SEC
LEFT ATRIUM VOLUME INDEX: 18.2 ML/M2
LV EF BIPLANE MOD: 63.2 %
MAXIMAL PREDICTED HEART RATE: 147 BPM
PERCENT MAX PREDICTED HR: 57.82 %
SINUS: 3.3 CM
SPECT HRT GATED+EF W RNC IV: 54 %
STJ: 2.9 CM
STRESS BASELINE BP: NORMAL MMHG
STRESS BASELINE HR: 66 BPM
STRESS PERCENT HR: 68 %
STRESS POST EXERCISE DUR SEC: 10 SEC
STRESS POST PEAK BP: NORMAL MMHG
STRESS POST PEAK HR: 85 BPM
STRESS TARGET HR: 125 BPM

## 2025-01-08 PROCEDURE — 25510000001 PERFLUTREN 6.52 MG/ML SUSPENSION 2 ML VIAL: Performed by: INTERNAL MEDICINE

## 2025-01-08 PROCEDURE — 93017 CV STRESS TEST TRACING ONLY: CPT

## 2025-01-08 PROCEDURE — A9502 TC99M TETROFOSMIN: HCPCS | Performed by: INTERNAL MEDICINE

## 2025-01-08 PROCEDURE — 25010000002 REGADENOSON 0.4 MG/5ML SOLUTION: Performed by: INTERNAL MEDICINE

## 2025-01-08 PROCEDURE — 78452 HT MUSCLE IMAGE SPECT MULT: CPT

## 2025-01-08 PROCEDURE — 93306 TTE W/DOPPLER COMPLETE: CPT

## 2025-01-08 PROCEDURE — 34310000005 TECHNETIUM TETROFOSMIN KIT: Performed by: INTERNAL MEDICINE

## 2025-01-08 RX ORDER — REGADENOSON 0.08 MG/ML
0.4 INJECTION, SOLUTION INTRAVENOUS
Status: COMPLETED | OUTPATIENT
Start: 2025-01-08 | End: 2025-01-08

## 2025-01-08 RX ADMIN — REGADENOSON 0.4 MG: 0.08 INJECTION, SOLUTION INTRAVENOUS at 13:55

## 2025-01-08 RX ADMIN — PERFLUTREN 2 ML: 6.52 INJECTION, SUSPENSION INTRAVENOUS at 12:56

## 2025-01-08 RX ADMIN — TETROFOSMIN 1 DOSE: 1.38 INJECTION, POWDER, LYOPHILIZED, FOR SOLUTION INTRAVENOUS at 13:56

## 2025-01-08 RX ADMIN — TETROFOSMIN 1 DOSE: 1.38 INJECTION, POWDER, LYOPHILIZED, FOR SOLUTION INTRAVENOUS at 13:00

## 2025-01-09 ENCOUNTER — TELEPHONE (OUTPATIENT)
Dept: CARDIOLOGY | Facility: CLINIC | Age: 75
End: 2025-01-09
Payer: COMMERCIAL

## 2025-01-09 NOTE — TELEPHONE ENCOUNTER
----- Message from Williams Mohr sent at 1/9/2025  9:13 AM EST -----  Please notify Michael both echocardiogram and stress test are normal.  He has a normal heart and valve function.  No evidence for significant narrowing of coronary arteries.  Continue current care.  Can you ask how his blood pressure has been?  Follow-up in cardiac clinic in 6 months. Thank you

## 2025-01-09 NOTE — PROGRESS NOTES
Please notify Michael both echocardiogram and stress test are normal.  He has a normal heart and valve function.  No evidence for significant narrowing of coronary arteries.  Continue current care.  Can you ask how his blood pressure has been?  Follow-up in cardiac clinic in 6 months. Thank you

## 2025-01-09 NOTE — TELEPHONE ENCOUNTER
I spoke with Michael Johnson and updated pt on results from provider.  Pt verbalized understanding.    Pt has only been taking the bisoprolol- he didn't realize he was supposed to be taking both of the medications.  He will now start taking bisoprolol and amlodipine.  I asked pt to begin checking his BP 1-2 hs after his medications, write this down with his BP/HR/date, and to update the office after about 2 weeks.    I scheduled his 1 month FU.    Pt is asking for Amlodipine rx to be sent to [facility] pharmacy on file.  I'm not sure if he has any at home or not but it was originally sent to a different pharmacy that he doesn't like to go to.  He would also prefer for all BP meds to be prescribed by one provider, and it's currently been ordered by the ED physician.    Thank you,    Anisha OLSON RN  Triage [facility]  01/09/25 09:14 EST

## 2025-01-10 RX ORDER — AMLODIPINE BESYLATE 5 MG/1
5 TABLET ORAL DAILY
Qty: 30 TABLET | Refills: 2 | Status: SHIPPED | OUTPATIENT
Start: 2025-01-10 | End: 2025-01-10 | Stop reason: SDUPTHER

## 2025-01-10 RX ORDER — AMLODIPINE BESYLATE 5 MG/1
5 TABLET ORAL DAILY
Qty: 30 TABLET | Refills: 2 | Status: SHIPPED | OUTPATIENT
Start: 2025-01-10 | End: 2025-04-10

## 2025-01-28 DIAGNOSIS — I73.9 PAD (PERIPHERAL ARTERY DISEASE): ICD-10-CM

## 2025-01-28 RX ORDER — CILOSTAZOL 100 MG/1
100 TABLET ORAL 2 TIMES DAILY
Qty: 60 TABLET | Refills: 5 | Status: SHIPPED | OUTPATIENT
Start: 2025-01-28

## 2025-02-10 ENCOUNTER — OFFICE VISIT (OUTPATIENT)
Dept: CARDIOLOGY | Facility: CLINIC | Age: 75
End: 2025-02-10
Payer: COMMERCIAL

## 2025-02-10 VITALS
DIASTOLIC BLOOD PRESSURE: 80 MMHG | HEIGHT: 72 IN | SYSTOLIC BLOOD PRESSURE: 140 MMHG | WEIGHT: 224.2 LBS | OXYGEN SATURATION: 95 % | BODY MASS INDEX: 30.37 KG/M2

## 2025-02-10 DIAGNOSIS — I73.9 PERIPHERAL VASCULAR DISEASE: ICD-10-CM

## 2025-02-10 DIAGNOSIS — I10 ESSENTIAL HYPERTENSION: ICD-10-CM

## 2025-02-10 DIAGNOSIS — I35.0 AORTIC VALVE STENOSIS, MILD: ICD-10-CM

## 2025-02-10 DIAGNOSIS — I25.10 CORONARY ARTERY CALCIFICATION SEEN ON CAT SCAN: ICD-10-CM

## 2025-02-10 DIAGNOSIS — E78.00 HYPERCHOLESTEROLEMIA: Primary | ICD-10-CM

## 2025-02-10 DIAGNOSIS — M19.90 ARTHRITIS: ICD-10-CM

## 2025-02-10 PROCEDURE — 99213 OFFICE O/P EST LOW 20 MIN: CPT | Performed by: INTERNAL MEDICINE

## 2025-02-10 RX ORDER — VALSARTAN AND HYDROCHLOROTHIAZIDE 80; 12.5 MG/1; MG/1
1 TABLET, FILM COATED ORAL DAILY
Qty: 30 TABLET | Refills: 3 | Status: SHIPPED | OUTPATIENT
Start: 2025-02-10

## 2025-02-10 NOTE — PROGRESS NOTES
"PATIENTINFORMATION    Date of Office Visit: 02/10/2025  Encounter Provider: Williams Mohr MD  Place of Service: [facility] CARDIOLOGY  Patient Name: Michael Johnson  : 1950    Subjective:     Encounter Date:02/10/2025      Patient ID: Michael Johnson is a 75 y.o. male.    Chief Complaint   Patient presents with    Shortness of Breath     Patient is in the office today for his 1 month follow up appointment.        HPI  Mr. Johnson is a pleasant 75 years old gentleman who came to cardiac clinic for follow-up visit.  He reports hand and feet swelling with pain since he was started on amlodipine about a month ago.  Blood pressure control is excellent.  No significant new complaints today.        ROS  All systems reviewed and negative except as noted in HPI.    Past Medical History:   Diagnosis Date    Atherosclerosis of aorta     Atherosclerosis of native arteries of extremities with intermittent claudication, left leg 2019    Atherosclerosis of native arteries of extremities with rest pain, left leg     Bilateral carotid artery stenosis 02/15/2021       Past Surgical History:   Procedure Laterality Date    ADENOIDECTOMY         Social History     Socioeconomic History    Marital status:    Tobacco Use    Smoking status: Former     Passive exposure: Past    Smokeless tobacco: Never   Vaping Use    Vaping status: Never Used   Substance and Sexual Activity    Alcohol use: No    Drug use: No    Sexual activity: Yes     Partners: Female       Family History   Problem Relation Age of Onset    Clotting disorder Mother     Hypertension Father     Heart disease Father          Procedures       Objective:     /80 (BP Location: Left arm, Patient Position: Sitting, Cuff Size: Adult)   Ht 182.9 cm (72\")   Wt 102 kg (224 lb 3.2 oz)   SpO2 95%   BMI 30.41 kg/m²  Body mass index is 30.41 kg/m².     Constitutional:       General: Not in acute distress.     Appearance: Well-developed. " Not diaphoretic.   Eyes:      Pupils: Pupils are equal, round, and reactive to light.   HENT:      Head: Normocephalic and atraumatic.   Neck:      Thyroid: No thyromegaly.   Pulmonary:      Effort: Pulmonary effort is normal. No respiratory distress.      Breath sounds: Normal breath sounds. No wheezing. No rales.   Chest:      Chest wall: Not tender to palpatation.   Cardiovascular:      Normal rate. Regular rhythm.      No gallop.    Pulses:     Intact distal pulses.   Edema:     Peripheral edema absent.   Abdominal:      General: Bowel sounds are normal. There is no distension.      Palpations: Abdomen is soft.      Tenderness: There is no guarding.   Musculoskeletal: Normal range of motion.         General: No deformity.      Cervical back: Normal range of motion and neck supple. Skin:     General: Skin is warm and dry.      Findings: No rash.   Neurological:      Mental Status: Alert and oriented to person, place, and time.      Cranial Nerves: No cranial nerve deficit.      Deep Tendon Reflexes: Reflexes are normal and symmetric.   Psychiatric:         Judgment: Judgment normal.         Review Of Data: I have reviewed pertinent recent labs, images and documents and pertinent findings included in HPI or assessment below.      Assessment/Plan:   Exertional shortness of breath: Recovering from unspecified viral infection with severe cough which and chest discomfort with initially started on treatment for right infection.  Essential hypertension  Hyperlipidemia on atorvastatin  Significant coronary calcification incidentally noted on CT chest.  Normal echo and MPI in January 2025  PAD/carotid artery disease-sees vascular.  No intervention in the past.  CKD stage 3a     Excellent blood pressure control but hand and feet swelling with amlodipine.  I have switched him to valsartan/hydrochlorothiazide and stopped amlodipine.  BMP in 2 weeks   Diagnosis and plan of care discussed with patient and verbalized  understanding.            Your medication list            Accurate as of February 10, 2025 12:29 PM. If you have any questions, ask your nurse or doctor.                START taking these medications        Instructions Last Dose Given Next Dose Due   valsartan-hydrochlorothiazide 80-12.5 MG per tablet  Commonly known as: Diovan HCT  Started by: Williams Mohr      Take 1 tablet by mouth Daily.              CONTINUE taking these medications        Instructions Last Dose Given Next Dose Due   aspirin 81 MG EC tablet      Take 1 tablet by mouth Daily.       atorvastatin 40 MG tablet  Commonly known as: LIPITOR      TAKE 1 TABLET BY MOUTH EVERY DAY       bisoprolol 5 MG tablet  Commonly known as: ZEBeta      Take 1 tablet by mouth Daily.       cilostazol 100 MG tablet  Commonly known as: PLETAL      TAKE 1 TABLET BY MOUTH TWICE DAILY       methocarbamol 750 MG tablet  Commonly known as: ROBAXIN      Take 1 tablet by mouth 3 (Three) Times a Day As Needed for Muscle Spasms.              STOP taking these medications      amLODIPine 5 MG tablet  Commonly known as: NORVASC  Stopped by: Williams Mohr                  Where to Get Your Medications        These medications were sent to [id number] - [place], [place] - [address] [place] [address] AT [facility] - [phone]  - [phone] [facility] [facility], [facility] [address]      Phone: [phone]   valsartan-hydrochlorothiazide 80-12.5 MG per tablet             Williams Mohr MD  02/10/25  12:29 EST

## 2025-02-24 ENCOUNTER — LAB (OUTPATIENT)
Dept: LAB | Facility: HOSPITAL | Age: 75
End: 2025-02-24
Payer: COMMERCIAL

## 2025-02-24 DIAGNOSIS — M19.90 ARTHRITIS: ICD-10-CM

## 2025-02-24 DIAGNOSIS — I10 ESSENTIAL HYPERTENSION: ICD-10-CM

## 2025-02-24 LAB
ANION GAP SERPL CALCULATED.3IONS-SCNC: 6.9 MMOL/L (ref 5–15)
BUN SERPL-MCNC: 24 MG/DL (ref 8–23)
BUN/CREAT SERPL: 17.1 (ref 7–25)
CALCIUM SPEC-SCNC: 9.3 MG/DL (ref 8.6–10.5)
CHLORIDE SERPL-SCNC: 99 MMOL/L (ref 98–107)
CO2 SERPL-SCNC: 29.1 MMOL/L (ref 22–29)
CREAT SERPL-MCNC: 1.4 MG/DL (ref 0.76–1.27)
EGFRCR SERPLBLD CKD-EPI 2021: 52.7 ML/MIN/1.73
ERYTHROCYTE [SEDIMENTATION RATE] IN BLOOD: 8 MM/HR (ref 0–20)
GLUCOSE SERPL-MCNC: 148 MG/DL (ref 65–99)
POTASSIUM SERPL-SCNC: 3.6 MMOL/L (ref 3.5–5.2)
SODIUM SERPL-SCNC: 135 MMOL/L (ref 136–145)
URATE SERPL-MCNC: 5.6 MG/DL (ref 3.4–7)

## 2025-02-24 PROCEDURE — 84550 ASSAY OF BLOOD/URIC ACID: CPT

## 2025-02-24 PROCEDURE — 36415 COLL VENOUS BLD VENIPUNCTURE: CPT

## 2025-02-24 PROCEDURE — 85652 RBC SED RATE AUTOMATED: CPT

## 2025-02-24 PROCEDURE — 80048 BASIC METABOLIC PNL TOTAL CA: CPT

## 2025-02-27 NOTE — PROGRESS NOTES
Can you ask Michael how his blood pressure has been recently since after medication change and if leg swelling improved?  Please notify blood work on 2/24/2025 shows stable kidney function.  Continue current care otherwise.  Thank you

## 2025-03-24 RX ORDER — BISOPROLOL FUMARATE 5 MG/1
5 TABLET, FILM COATED ORAL DAILY
Qty: 30 TABLET | Refills: 3 | Status: SHIPPED | OUTPATIENT
Start: 2025-03-24

## 2025-05-12 RX ORDER — VALSARTAN AND HYDROCHLOROTHIAZIDE 80; 12.5 MG/1; MG/1
1 TABLET, FILM COATED ORAL DAILY
Qty: 30 TABLET | Refills: 4 | Status: SHIPPED | OUTPATIENT
Start: 2025-05-12

## 2025-05-12 NOTE — TELEPHONE ENCOUNTER
Failed protocol.    LOV 2/10/25 [facility]  NOV 8/26/25 [facility]    Jayna Olson Cardiology Triage  05/12/25 11:50 EDT

## 2025-06-05 ENCOUNTER — OFFICE VISIT (OUTPATIENT)
Age: 75
End: 2025-06-05
Payer: COMMERCIAL

## 2025-06-05 ENCOUNTER — HOSPITAL ENCOUNTER (OUTPATIENT)
Facility: HOSPITAL | Age: 75
Discharge: HOME | End: 2025-06-05
Payer: COMMERCIAL

## 2025-06-05 VITALS
DIASTOLIC BLOOD PRESSURE: 74 MMHG | WEIGHT: 224 LBS | BODY MASS INDEX: 30.34 KG/M2 | SYSTOLIC BLOOD PRESSURE: 152 MMHG | HEIGHT: 72 IN

## 2025-06-05 DIAGNOSIS — I73.9 PAD (PERIPHERAL ARTERY DISEASE): ICD-10-CM

## 2025-06-05 DIAGNOSIS — I65.23 BILATERAL CAROTID ARTERY STENOSIS: Primary | ICD-10-CM

## 2025-06-05 DIAGNOSIS — I65.23 BILATERAL CAROTID ARTERY STENOSIS: ICD-10-CM

## 2025-06-05 DIAGNOSIS — I73.9 PERIPHERAL VASCULAR DISEASE: ICD-10-CM

## 2025-06-05 DIAGNOSIS — I65.23 CAROTID STENOSIS, BILATERAL: ICD-10-CM

## 2025-06-05 LAB
BH CV LOWER ARTERIAL LEFT ABI RATIO: 0.39
BH CV LOWER ARTERIAL LEFT DORSALIS PEDIS SYS MAX: 58
BH CV LOWER ARTERIAL LEFT POST TIBIAL SYS MAX: 60
BH CV LOWER ARTERIAL RIGHT ABI RATIO: 1
BH CV LOWER ARTERIAL RIGHT DORSALIS PEDIS SYS MAX: 86
BH CV LOWER ARTERIAL RIGHT POST TIBIAL SYS MAX: 156
BH CV XLRA MEAS LEFT CAROTID BULB EDV: -20.4 CM/SEC
BH CV XLRA MEAS LEFT CAROTID BULB PSV: -75.3 CM/SEC
BH CV XLRA MEAS LEFT DIST CCA EDV: -18.8 CM/SEC
BH CV XLRA MEAS LEFT DIST CCA PSV: -78.4 CM/SEC
BH CV XLRA MEAS LEFT DIST ICA EDV: -31.8 CM/SEC
BH CV XLRA MEAS LEFT DIST ICA PSV: -113 CM/SEC
BH CV XLRA MEAS LEFT ICA/CCA RATIO: 2.54
BH CV XLRA MEAS LEFT MID CCA EDV: 18 CM/SEC
BH CV XLRA MEAS LEFT MID CCA PSV: 72.1 CM/SEC
BH CV XLRA MEAS LEFT MID ICA EDV: -50.8 CM/SEC
BH CV XLRA MEAS LEFT MID ICA PSV: -172.9 CM/SEC
BH CV XLRA MEAS LEFT PROX CCA EDV: 23.5 CM/SEC
BH CV XLRA MEAS LEFT PROX CCA PSV: 86.2 CM/SEC
BH CV XLRA MEAS LEFT PROX ICA EDV: -72.7 CM/SEC
BH CV XLRA MEAS LEFT PROX ICA PSV: -197.6 CM/SEC
BH CV XLRA MEAS LEFT PROX SCLA PSV: 166 CM/SEC
BH CV XLRA MEAS LEFT VERTEBRAL A EDV: -11.3 CM/SEC
BH CV XLRA MEAS LEFT VERTEBRAL A PSV: -50.1 CM/SEC
BH CV XLRA MEAS RIGHT CAROTID BULB EDV: -23 CM/SEC
BH CV XLRA MEAS RIGHT CAROTID BULB PSV: -57.6 CM/SEC
BH CV XLRA MEAS RIGHT DIST CCA EDV: -23.1 CM/SEC
BH CV XLRA MEAS RIGHT DIST CCA PSV: -81.3 CM/SEC
BH CV XLRA MEAS RIGHT DIST ICA EDV: -29.5 CM/SEC
BH CV XLRA MEAS RIGHT DIST ICA PSV: -78.9 CM/SEC
BH CV XLRA MEAS RIGHT ICA/CCA RATIO: 1
BH CV XLRA MEAS RIGHT MID CCA EDV: 18.9 CM/SEC
BH CV XLRA MEAS RIGHT MID CCA PSV: 89.7 CM/SEC
BH CV XLRA MEAS RIGHT MID ICA EDV: 29.5 CM/SEC
BH CV XLRA MEAS RIGHT MID ICA PSV: 80.6 CM/SEC
BH CV XLRA MEAS RIGHT PROX CCA EDV: 20.3 CM/SEC
BH CV XLRA MEAS RIGHT PROX CCA PSV: 79.2 CM/SEC
BH CV XLRA MEAS RIGHT PROX ECA EDV: -13.9 CM/SEC
BH CV XLRA MEAS RIGHT PROX ECA PSV: -117 CM/SEC
BH CV XLRA MEAS RIGHT PROX ICA EDV: -21 CM/SEC
BH CV XLRA MEAS RIGHT PROX ICA PSV: -80.6 CM/SEC
BH CV XLRA MEAS RIGHT PROX SCLA PSV: 134.6 CM/SEC
BH CV XLRA MEAS RIGHT VERTEBRAL A EDV: -21.7 CM/SEC
BH CV XLRA MEAS RIGHT VERTEBRAL A PSV: -70.1 CM/SEC
LEFT ARM BP: NORMAL MMHG
RIGHT ARM BP: NORMAL MMHG
UPPER ARTERIAL LEFT ARM BRACHIAL SYS MAX: NORMAL
UPPER ARTERIAL RIGHT ARM BRACHIAL SYS MAX: NORMAL

## 2025-06-05 PROCEDURE — 99213 OFFICE O/P EST LOW 20 MIN: CPT | Performed by: NURSE PRACTITIONER

## 2025-06-05 PROCEDURE — 93880 EXTRACRANIAL BILAT STUDY: CPT

## 2025-06-05 PROCEDURE — 93922 UPR/L XTREMITY ART 2 LEVELS: CPT

## 2025-06-05 NOTE — PROGRESS NOTES
Chief Complaint  Carotid Artery Disease    Subjective        Michael Johnson presents to [facility] VASCULAR SURGERY  HPI   Michael Johnson is a 75 y.o. male that has been followed in our office Dr. Camargo for carotid artery stenosis and peripheral arterial disease.  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  Dr. Camargo had discussed proceeding with surgery with him in the past, however he had preferred medical treatment with Pletal and a walking protocol.  He returns today in follow up along with a carotid duplex and ABIs. He  reports he has been doing well without hospitalizations or surgeries. He denies any symptoms consistent with CVA, TIA, or amaurosis fugax. He  denies any worsening claudication symptoms, rest pain, or tissue loss. He reports improvement in symptoms since last visit.     Review of Systems   Constitutional:  Negative for fever.   Eyes:  Negative for visual disturbance.   Cardiovascular:  Negative for leg swelling.   Gastrointestinal:  Negative for abdominal pain.   Musculoskeletal:  Negative for back pain.   Skin:  Negative for color change, pallor and wound.   Neurological:  Negative for dizziness, facial asymmetry, speech difficulty and weakness.        Michael Johnson  reports that he has been smoking cigarettes. He has been exposed to tobacco smoke. He has never used smokeless tobacco..        Objective   Vital Signs:  Vitals:    06/05/25 1358   BP: 152/74        Body mass index is 30.38 kg/m².   BMI is >= 25 and <30. (Overweight) The following options were offered after discussion;: information on healthy weight added to patient's after visit summary         Physical Exam  Vitals reviewed.   Constitutional:       Appearance: Normal appearance.   HENT:      Head: Normocephalic.   Cardiovascular:      Rate and Rhythm: Normal rate and regular rhythm.      Pulses:           Dorsalis pedis pulses are detected w/ Doppler on the right side and detected w/  Doppler on the left side.        Posterior tibial pulses are detected w/ Doppler on the right side and detected w/ Doppler on the left side.   Pulmonary:      Effort: Pulmonary effort is normal.   Skin:     General: Skin is warm.   Neurological:      General: No focal deficit present.      Mental Status: He is alert and oriented to person, place, and time.   Psychiatric:         Mood and Affect: Mood normal.          Result Review :      Previous carotid duplex: Normal velocities on the right, 50 to 69% stenosis on the left    Carotid duplex from today: Duplex Carotid Ultrasound CAR (06/05/2025 13:29)     Previous ABIs: 0.65 on the right, 0.55 on the left    ABIs today: Doppler Ankle Brachial Index Single Level CAR (06/05/2025 13:44)                    Assessment and Plan     Diagnoses and all orders for this visit:    1. Bilateral carotid artery stenosis (Primary)    2. Peripheral vascular disease  -     Doppler Ankle Brachial Index Single Level CAR; Future    3. Carotid stenosis, bilateral               Patient presents today for ongoing management of his  carotid artery stenosis and peripheral arterial disease.  Today, the ALEJANDRA of the right is on the left is 0.39.  The right side is likely falsely elevated due to calcification.  He has had a drop on the left when previously this was 0.55.  He is not symptomatic from this.  He is to continue his antiplatelet agent which is Aspirin. He is also on cilostazol. He is on a statin for cholesterol control.  We discussed adequate blood pressure control.  Will recheck this in 6 months.  We will plan to check his carotid duplex in 1 year.    Follow Up     Return in about 6 months (around 12/5/2025) for alejandra.  Patient was given instructions and counseling regarding his condition or for health maintenance advice. Please see specific information pulled into the AVS if appropriate.     TAMI Bee

## 2025-06-10 RX ORDER — VALSARTAN AND HYDROCHLOROTHIAZIDE 80; 12.5 MG/1; MG/1
1 TABLET, FILM COATED ORAL DAILY
Qty: 90 TABLET | Refills: 0 | Status: SHIPPED | OUTPATIENT
Start: 2025-06-10

## 2025-07-15 ENCOUNTER — TELEPHONE (OUTPATIENT)
Age: 75
End: 2025-07-15

## 2025-07-23 RX ORDER — BISOPROLOL FUMARATE 5 MG/1
5 TABLET, FILM COATED ORAL DAILY
Qty: 30 TABLET | Refills: 3 | Status: SHIPPED | OUTPATIENT
Start: 2025-07-23

## 2025-07-23 NOTE — TELEPHONE ENCOUNTER
Protocol Failed.     Last /74  6/5/2025  NOV 8/26/2025 Y[facility]  LOV 2/10/2025 [facility]    Assessment/Plan:   Exertional shortness of breath: Recovering from unspecified viral infection with severe cough which and chest discomfort with initially started on treatment for right infection.  Essential hypertension  Hyperlipidemia on atorvastatin  Significant coronary calcification incidentally noted on CT chest.  Normal echo and MPI in January 2025  PAD/carotid artery disease-sees vascular.  No intervention in the past.  CKD stage 3a      Excellent blood pressure control but hand and feet swelling with amlodipine.  I have switched him to valsartan/hydrochlorothiazide and stopped amlodipine.  BMP in 2 weeks   Diagnosis and plan of care discussed with patient and verbalized understanding.

## 2025-07-24 DIAGNOSIS — M25.50 ARTHRALGIA, UNSPECIFIED JOINT: Primary | ICD-10-CM
